# Patient Record
Sex: MALE | Race: WHITE
[De-identification: names, ages, dates, MRNs, and addresses within clinical notes are randomized per-mention and may not be internally consistent; named-entity substitution may affect disease eponyms.]

---

## 2023-07-30 ENCOUNTER — HOSPITAL ENCOUNTER (EMERGENCY)
Dept: HOSPITAL 47 - EC | Age: 33
Discharge: HOME | End: 2023-07-30
Payer: COMMERCIAL

## 2023-07-30 VITALS — RESPIRATION RATE: 18 BRPM | TEMPERATURE: 98.8 F

## 2023-07-30 VITALS — SYSTOLIC BLOOD PRESSURE: 132 MMHG | DIASTOLIC BLOOD PRESSURE: 78 MMHG | HEART RATE: 80 BPM
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
## 2023-07-30 DIAGNOSIS — Z88.0: ICD-10-CM
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
## 2023-07-30 DIAGNOSIS — S09.90XA: ICD-10-CM

## 2023-07-30 DIAGNOSIS — S16.1XXA: Primary | ICD-10-CM

## 2023-07-30 PROCEDURE — 99283 EMERGENCY DEPT VISIT LOW MDM: CPT

## 2023-07-30 PROCEDURE — 72050 X-RAY EXAM NECK SPINE 4/5VWS: CPT

## 2023-07-30 PROCEDURE — 96372 THER/PROPH/DIAG INJ SC/IM: CPT

## 2023-07-30 NOTE — XR
EXAMINATION TYPE: XR cervical spine comp

 

DATE OF EXAM: 7/30/2023 2:25 PM

 

INDICATION: 

Patient age:Male;  33 years old; 

Reason for study: neck pain hyperflexion; PHH. 

 

COMPARISON: None

 

TECHNIQUE: The cervical spine was imaged in 4 projections. Frontal, lateral, odontoid and bilateral o
blique.

 

FINDINGS:

No acute fracture or dislocation. Straightening of the normal cervical lordosis which can be due to p
atient position versus muscle spasm. The intervertebral disk spaces are preserved.  Pedicles are inta
ct.  Soft tissues are within normal limits. The odontoid appears intact.

 

IMPRESSION: 

No fracture or dislocation.

## 2023-07-30 NOTE — ED
General Adult HPI





- General


Chief complaint: Neck Pain/Injury


Stated complaint: Neck pain


Time Seen by Provider: 07/30/23 13:40


Source: patient


Mode of arrival: ambulatory





- History of Present Illness


Initial comments: 


33-year-old male presents to the ED with a chief complaint of neck pain.  

Patient states yesterday was playing volleyball when he and his opposing member 

collided.  States that the player on the other side fell onto him causing him to

flip over and fell onto his upper back/neck.  There is no loss of consciousness 

at this time.  Denies nausea or vomiting.  Now notes neck pain.  Denies any 

other injury at this time.  Per wife, acting appropriately.  No other 

complaints.








- Related Data


                                  Previous Rx's











 Medication  Instructions  Recorded


 


Baclofen 10 mg PO TID PRN #20 tab 07/30/23











                                    Allergies











Allergy/AdvReac Type Severity Reaction Status Date / Time


 


Penicillins Allergy  Anaphylaxis Verified 07/30/23 13:31














Review of Systems


ROS Statement: 


Those systems with pertinent positive or pertinent negative responses have been 

documented in the HPI.





ROS Other: All systems not noted in ROS Statement are negative.





Past Medical History


Past Medical History: No Reported History


Past Surgical History: No Surgical Hx Reported


Smoking Status: Never smoker


Past Alcohol Use History: None Reported


Past Drug Use History: None Reported





General Exam


Limitations: no limitations


General appearance: alert, in no apparent distress


Head exam: Present: atraumatic, normocephalic (No guillen signs or raccoons eyes)


Neck exam: Present: other (Midline cervical spinal tenderness at C5-7.  Left 

paraspinal cervical tenderness palpation)


Respiratory exam: Present: normal lung sounds bilaterally


Cardiovascular Exam: Present: regular rate, normal rhythm


Extremities exam: Present: other (Full active range of motion of bilateral upper

and lower extremities with strength and sensation intact)


Back exam: Present: other (No midline thoracic or lumbar spinal tenderness to 

palpation)


Neurological exam: Present: alert, oriented X3, CN II-XII intact (GCS 15)


Skin exam: Present: warm, dry





Course


                                   Vital Signs











  07/30/23





  13:26


 


Temperature 98.8 F


 


Pulse Rate 93


 


Respiratory 18





Rate 


 


Blood Pressure 154/95


 


O2 Sat by Pulse 98





Oximetry 














Medical Decision Making





- Medical Decision Making


Was pt. sent in by a medical professional or institution (, PA, NP, urgent 

care, hospital, or nursing home...) When possible be specific


@  -No


Did you speak to anyone other than the patient for history (EMS, parent, family,

police, friend...)? What history was obtained from this source 


@  -Spoke to the patient's wife reports and is of history.  For further details 

please see HPI.


Did you review nursing and triage notes (agree or disagree)?  Why? 


@  -I reviewed and agree with nursing and triage notes


Were old charts reviewed (outside hosp., previous admission, EMS record, old 

EKG, old radiological studies, urgent care reports/EKG's, nursing home records)?

Report findings 


@  -No old charts were reviewed


Differential Diagnosis (chest pain, altered mental status, abdominal pain women,

abdominal pain men, vaginal bleeding, weakness, fever, dyspnea, syncope, 

headache, dizziness, GI bleed, back pain, seizure, CVA, palpatations, mental 

health, musculoskeletal)? 


@  -Differential Musculoskeletal


Muscular strain, contusion, ligament sprain, fracture, arthritis, septic 

arthritis, bursitis, cellulitis, muscle spasm, nerve compression, DVT, arterial 

occlusion, herpes zoster, electrolyte abnormality, tumor.... This is not meant 

to be in all inclusive list


EKG interpreted by me (3pts min.).


@  -None


X-rays interpreted by me (1pt min.).


@  - Cervical X-ray show no acute findings.


CT interpreted by me (1pt min.).


@  -None done


U/S interpreted by me (1pt. min.).


@  -None done


What testing was considered but not performed or refused? (CT, X-rays, U/S, 

labs)? Why?


@  -CT of the head was considered however at this time no significant mechanism 

of injury without LOC or nausea/vomiting.  Additionally, wife states that the 

patient is acting his normal self.


What meds were considered but not given or refused? Why?


@  -None


Did you discuss the management of the patient with other professionals 

(professionals i.e. , PA, NP, lab, RT, psych nurse, , , 

teacher, , )? Give summary


@  -No


Was smoking cessation discussed for >3mins.?


@  -No


Was critical care preformed (if so, how long)?


@  -No


Were there social determinants of health that impacted care today? How? 

(Homelessness, low income, unemployed, alcoholism, drug addiction, 

transportation, low edu. Level, literacy, decrease access to med. care, penitentiary, 

rehab)?


@  -No


Was there de-escalation of care discussed even if they declined (Discuss DNR or 

withdrawal of care, Hospice)? DNR status


@  -No


What co-morbidities impacted this encounter? (DM, HTN, Smoking, COPD, CAD, 

Cancer, CVA, ARF, Chemo, Hep., AIDS, mental health diagnosis, sleep apnea, 

morbid obesity)?


@  -None


Was patient admitted / discharged? Hospital course, mention meds given and 

route, prescriptions, significant lab abnormalities, going to OR and other 

pertinent info.


@  -Discharged.  Cervical spine x-ray shows no acute findings.  Patient reports 

no injuries anywhere else and has full active range of motion and good strength 

and sensation in bilateral upper and lower extremities.  Symptoms likely 

musculoskeletal in nature.  Patient had improvement of pain with Toradol here in

the ED.  Advised NSAID/Tylenol use.  Provided prescription for baclofen.  

Discussed return precautions with patient who verbalizes agreement.


Undiagnosed new problem with uncertain prognosis?


@  -No


Drug Therapy requiring intensive monitoring for toxicity (Heparin, Nitro, 

Insulin, Cardizem)?


@  -No


Were any procedures done?


@  -No


Diagnosis/symptom?


@  -Neck pain/strain, minor head injury


Acute, or Chronic, or Acute on Chronic?


@  -Acute


Uncomplicated (without systemic symptoms) or Complicated (systemic symptoms)?


@  -Uncomplicated


Side effects of treatment?


@  -No


Exacerbation, Progression, or Severe Exacerbation?


@  -No


Poses a threat to life or bodily function? How? (Chest pain, USA, MI, pneumonia,

PE, COPD, DKA, ARF, appy, cholecystitis, CVA, Diverticulitis, Homicidal, 

Suicidal, threat to staff... and all critical care pts)


@  -No








Disposition


Clinical Impression: 


 Neck pain, Neck strain, Minor head injury without loss of consciousness





Disposition: HOME SELF-CARE


Condition: Good


Instructions (If sedation given, give patient instructions):  Cervical Strain 

(ED), Cervical Sprain (ED)


Additional Instructions: 


Please return to the Emergency Department if symptoms worsen or any other 

concerns.


Prescriptions: 


Baclofen 10 mg PO TID PRN #20 tab


 PRN Reason: Pain


Is patient prescribed a controlled substance at d/c from ED?: No


Referrals: 


None,Stated [Primary Care Provider] - 1-2 days


Time of Disposition: 15:10